# Patient Record
Sex: MALE | Race: AMERICAN INDIAN OR ALASKA NATIVE
[De-identification: names, ages, dates, MRNs, and addresses within clinical notes are randomized per-mention and may not be internally consistent; named-entity substitution may affect disease eponyms.]

---

## 2019-03-29 ENCOUNTER — HOSPITAL ENCOUNTER (INPATIENT)
Dept: HOSPITAL 5 - ED | Age: 54
LOS: 1 days | Discharge: HOME | DRG: 189 | End: 2019-03-30
Attending: INTERNAL MEDICINE | Admitting: HOSPITALIST
Payer: COMMERCIAL

## 2019-03-29 DIAGNOSIS — F17.210: ICD-10-CM

## 2019-03-29 DIAGNOSIS — E87.0: ICD-10-CM

## 2019-03-29 DIAGNOSIS — Z71.6: ICD-10-CM

## 2019-03-29 DIAGNOSIS — J44.9: ICD-10-CM

## 2019-03-29 DIAGNOSIS — R07.9: ICD-10-CM

## 2019-03-29 DIAGNOSIS — J96.01: Primary | ICD-10-CM

## 2019-03-29 DIAGNOSIS — E66.9: ICD-10-CM

## 2019-03-29 DIAGNOSIS — R65.10: ICD-10-CM

## 2019-03-29 DIAGNOSIS — R73.9: ICD-10-CM

## 2019-03-29 LAB
BASOPHILS # (AUTO): 0 K/MM3 (ref 0–0.1)
BASOPHILS NFR BLD AUTO: 0.3 % (ref 0–1.8)
BUN SERPL-MCNC: 9 MG/DL (ref 9–20)
BUN/CREAT SERPL: 10 %
CALCIUM SERPL-MCNC: 9.1 MG/DL (ref 8.4–10.2)
EOSINOPHIL # BLD AUTO: 0.2 K/MM3 (ref 0–0.4)
EOSINOPHIL NFR BLD AUTO: 1.3 % (ref 0–4.3)
HCT VFR BLD CALC: 41.3 % (ref 35.5–45.6)
HEMOLYSIS INDEX: 0
HGB BLD-MCNC: 13.3 GM/DL (ref 11.8–15.2)
LYMPHOCYTES # BLD AUTO: 0.9 K/MM3 (ref 1.2–5.4)
LYMPHOCYTES NFR BLD AUTO: 7.8 % (ref 13.4–35)
MCHC RBC AUTO-ENTMCNC: 32 % (ref 32–34)
MCV RBC AUTO: 83 FL (ref 84–94)
MONOCYTES # (AUTO): 1.3 K/MM3 (ref 0–0.8)
MONOCYTES % (AUTO): 11.1 % (ref 0–7.3)
PLATELET # BLD: 263 K/MM3 (ref 140–440)
RBC # BLD AUTO: 4.99 M/MM3 (ref 3.65–5.03)

## 2019-03-29 PROCEDURE — 93010 ELECTROCARDIOGRAM REPORT: CPT

## 2019-03-29 PROCEDURE — 83036 HEMOGLOBIN GLYCOSYLATED A1C: CPT

## 2019-03-29 PROCEDURE — 93005 ELECTROCARDIOGRAM TRACING: CPT

## 2019-03-29 PROCEDURE — 93306 TTE W/DOPPLER COMPLETE: CPT

## 2019-03-29 PROCEDURE — 71045 X-RAY EXAM CHEST 1 VIEW: CPT

## 2019-03-29 PROCEDURE — 36415 COLL VENOUS BLD VENIPUNCTURE: CPT

## 2019-03-29 PROCEDURE — A9502 TC99M TETROFOSMIN: HCPCS

## 2019-03-29 PROCEDURE — 84484 ASSAY OF TROPONIN QUANT: CPT

## 2019-03-29 PROCEDURE — 85025 COMPLETE CBC W/AUTO DIFF WBC: CPT

## 2019-03-29 PROCEDURE — 83735 ASSAY OF MAGNESIUM: CPT

## 2019-03-29 PROCEDURE — 78452 HT MUSCLE IMAGE SPECT MULT: CPT

## 2019-03-29 PROCEDURE — 85379 FIBRIN DEGRADATION QUANT: CPT

## 2019-03-29 PROCEDURE — 83880 ASSAY OF NATRIURETIC PEPTIDE: CPT

## 2019-03-29 PROCEDURE — 82803 BLOOD GASES ANY COMBINATION: CPT

## 2019-03-29 PROCEDURE — 80048 BASIC METABOLIC PNL TOTAL CA: CPT

## 2019-03-29 PROCEDURE — 94640 AIRWAY INHALATION TREATMENT: CPT

## 2019-03-29 PROCEDURE — 93017 CV STRESS TEST TRACING ONLY: CPT

## 2019-03-30 VITALS — DIASTOLIC BLOOD PRESSURE: 60 MMHG | SYSTOLIC BLOOD PRESSURE: 106 MMHG

## 2019-03-30 PROCEDURE — 4A033R1 MEASUREMENT OF ARTERIAL SATURATION, PERIPHERAL, PERCUTANEOUS APPROACH: ICD-10-PCS | Performed by: HOSPITALIST

## 2019-03-30 RX ADMIN — IPRATROPIUM BROMIDE AND ALBUTEROL SULFATE SCH AMPUL: .5; 3 SOLUTION RESPIRATORY (INHALATION) at 14:21

## 2019-03-30 RX ADMIN — IPRATROPIUM BROMIDE AND ALBUTEROL SULFATE SCH: .5; 3 SOLUTION RESPIRATORY (INHALATION) at 10:22

## 2019-03-30 NOTE — EMERGENCY DEPARTMENT REPORT
HPI





- General


Chief Complaint: Chest Pain


Time Seen by Provider: 19 02:39





- HPI


HPI: 





Room 6





The patient is a 54-year-old male presenting with chief complaint chest pain and

shortness of breath.  The patient states this evening he developed left-sided 

chest pain sharp in nature associated with shortness of breath, nausea/vomiting 

and diaphoresis.  Patient admitted to a cough yesterday productive of yellow 

sputum but denies a cough currently.  The patient states his pain has been 

constant.  Patient states he's never had a stress test or cardiac 

catheterization





Location: [See above]


Duration: [See above]


Quality: Sharp


Severity: Currently 0/10


Modifying factors: [see above]


Context: [see above]


Mode of transportation: [not driving]





ED Past Medical Hx





- Past Medical History


Additional medical history: denies





- Surgical History


Past Surgical History?: No


Additional Surgical History: BL wrist, pelvis, skull





- Family History


Family history: no significant





- Social History


Smoking Status: Current Every Day Smoker (1 pack per day)


Substance Use Type: None (denies illicit drug use)





- Medications


Home Medications: 


                                Home Medications











 Medication  Instructions  Recorded  Confirmed  Last Taken  Type


 


No Known Home Medications [No  19 Unknown History





Reported Home Medications]     














ED Review of Systems


ROS: 


Stated complaint: CHEST PAIN


Other details as noted in HPI





Constitutional: diaphoresis


Eyes: denies: eye pain


ENT: denies: throat pain


Respiratory: cough, shortness of breath


Cardiovascular: chest pain


Endocrine: no symptoms reported


Gastrointestinal: nausea, vomiting


Genitourinary: denies: dysuria


Musculoskeletal: denies: back pain


Neurological: denies: headache





Physical Exam





- Physical Exam


Vital Signs: 


                                   Vital Signs











  19





  19:12 01:58


 


Temperature 98.2 F 98.5 F


 


Pulse Rate 94 H 77


 


Respiratory 24 18





Rate  


 


Blood Pressure 145/85 112/68





[Left]  


 


O2 Sat by Pulse 90 95





Oximetry  











Physical Exam: 





GENERAL: The patient is well-developed well-nourished male sleeping on stretcher

 not appearing to be in acute distress.  Patient awakens easily


HEENT: Normocephalic.  Atraumatic.  Extraocular motions are intact.  Patient has

 moist mucous membranes.


NECK: Supple.  Trachea midline


CHEST/LUNGS: Diminished bilaterally with occasional faint end expiratory 

wheezing on the left.  There is no respiratory distress noted.


HEART/CARDIOVASCULAR: Regular.  There is no tachycardia.  There is no gallop rub

 or murmur.


ABDOMEN: Abdomen is soft, nontender.  Patient has normal bowel sounds.  There is

 no abdominal distention.


SKIN: There is no rash.  There is no edema.  There is no diaphoresis.


NEURO: The patient is awake, alert, and oriented.  The patient is cooperative.  

 The patient has normal speech 


MUSCULOSKELETAL:  There is no evidence of acute injury.





ED Course


                                   Vital Signs











  19





  19:12 01:58


 


Temperature 98.2 F 98.5 F


 


Pulse Rate 94 H 77


 


Respiratory 24 18





Rate  


 


Blood Pressure 145/85 112/68





[Left]  


 


O2 Sat by Pulse 90 95





Oximetry  














ED Medical Decision Making





- Lab Data


Result diagrams: 


                                 19 20:42





                                 19 20:42





                                Laboratory Tests











  19





  20:42 20:42 20:42


 


WBC  11.7 H  


 


RBC  4.99  


 


Hgb  13.3  


 


Hct  41.3  


 


MCV  83 L  


 


MCH  27 L  


 


MCHC  32  


 


RDW  14.2  


 


Plt Count  263  


 


Lymph % (Auto)  7.8 L  


 


Mono % (Auto)  11.1 H  


 


Eos % (Auto)  1.3  


 


Baso % (Auto)  0.3  


 


Lymph #  0.9 L  


 


Mono #  1.3 H  


 


Eos #  0.2  


 


Baso #  0.0  


 


Seg Neutrophils %  79.5 H  


 


Seg Neutrophils #  9.3 H  


 


D-Dimer   


 


POC ABG pH   


 


POC ABG pCO2   


 


POC ABG pO2   


 


POC ABG HCO3   


 


POC ABG Total CO2   


 


POC ABG O2 Sat   


 


POC ABG Base Excess   


 


FiO2   


 


Sodium   148 H 


 


Potassium   4.1 


 


Chloride   101.1 


 


Carbon Dioxide   29 


 


Anion Gap   22 


 


BUN   9 


 


Creatinine   0.9 


 


Estimated GFR   > 60 


 


BUN/Creatinine Ratio   10 


 


Glucose   110 H 


 


Calcium   9.1 


 


Troponin T   < 0.010 


 


NT-Pro-B Natriuret Pep    95.78














  19





  01:00 03:16 04:05


 


WBC   


 


RBC   


 


Hgb   


 


Hct   


 


MCV   


 


MCH   


 


MCHC   


 


RDW   


 


Plt Count   


 


Lymph % (Auto)   


 


Mono % (Auto)   


 


Eos % (Auto)   


 


Baso % (Auto)   


 


Lymph #   


 


Mono #   


 


Eos #   


 


Baso #   


 


Seg Neutrophils %   


 


Seg Neutrophils #   


 


D-Dimer    186.22


 


POC ABG pH   7.379 


 


POC ABG pCO2   47.1 H 


 


POC ABG pO2   50 L 


 


POC ABG HCO3   27.8 


 


POC ABG Total CO2   29 


 


POC ABG O2 Sat   84 


 


POC ABG Base Excess   3 


 


FiO2   21 


 


Sodium   


 


Potassium   


 


Chloride   


 


Carbon Dioxide   


 


Anion Gap   


 


BUN   


 


Creatinine   


 


Estimated GFR   


 


BUN/Creatinine Ratio   


 


Glucose   


 


Calcium   


 


Troponin T  < 0.010  


 


NT-Pro-B Natriuret Pep   














- EKG Data


-: EKG Interpreted by Me


EKG shows normal: sinus rhythm


Rate: normal





- EKG Data


When compared to previous EKG there are: previous EKG unavailable


Interpretation: nonspecific ST-T wave kevin (T-wave inversion in lead aVL)





- Radiology Data


Radiology results: report reviewed (chest x-ray), image reviewed (chest x-ray)





[facility] [address] [place] [address] 

XRay Report Signed Patient: DIEGO RUSS MR#: [id number] : 1965 

Acct:[id number] Age/Sex: 54 / M ADM Date: 19 Loc: ED Attending Dr: 

Ordering Physician: LUIS VALERIO MD Date of Service: 19 Procedure(s): XR 

chest 1V ap Accession Number(s): [id number] cc: LUIS VALERIO MD Fluoro Time In 

Minutes: PROCEDURE: XR CHEST 1V AP TECHNIQUE: A portable upright view the chest 

was obtained. HISTORY: chest pain COMPARISONS: None FINDINGS: The heart size and

 vascularity appear normal. The lungs are clear. Pleural fluid is not seen. The 

bones and soft tissues do not show any acute changes. IMPRESSION: No acute 

cardiopulmonary process.. This document is electronically signed by Sophia Hull MD., 2019 03:36:29 AM ET Transcribed By: RB Dictated By: SOPHIA HULL MD Electronically Authenticated By: SOPHIA HULL MD Signed Date/Time:

 19 DD/DT: 19 TD/TT: 19 





- Differential Diagnosis


pneumonia, ACS, PE, COPD


Critical care attestation.: 


If time is entered above; I have spent that time in minutes in the direct care 

of this critically ill patient, excluding procedure time.








ED Disposition


Clinical Impression: 


 Hypoxia, Chest pain





Disposition:  OP ADMIT IP TO THIS HOSP


Is pt being admited?: Yes


Does the pt Need Aspirin: Yes


Condition: Fair


Instructions:  Chest Pain (ED)


Referrals: 


[facility], [place] [Primary Care Provider] - 3-5 Days


Time of Disposition: 05:11 (hospitalist paged (Dr Yañez))

## 2019-03-30 NOTE — TREADMILL REPORT
NUCLEAR STRESS TEST





DESCRIPTION OF PROCEDURE:  The patient is brought to the Cardiology lab and a

Lexiscan stress test is performed.  The patient tolerated the procedure well.





FINDINGS:  Post-stress images reveal fairly homogenous distribution of the

isotope.  Mild reduction of perfusion is noted in the inferior wall with minimal

reversibility.  This may indicate previous inferior infarction with mild

ischemia; however, this could also be artifactual.  Calculated left ventricular

ejection fraction is 68%.





IMPRESSION:

1.  Fairly homogeneous distribution of the isotope.  Small fixed inferior defect

with minimal reversibility of questionable significance.  May indicate old

inferior myocardial infarction with mild ischemia.

2.  Good systolic function with ejection fraction of 68%.

3.  The inferior abnormalities could also be artifactual.

4.  Suggests clinical correlation.





DD: 03/30/2019 11:04

DT: 03/30/2019 11:53

JOB# [id number]  [id number]

[facility]

## 2019-03-30 NOTE — XRAY REPORT
PROCEDURE: XR CHEST 1V AP 

 

TECHNIQUE:  A portable upright view the chest was obtained. 

 

HISTORY: chest pain 

 

COMPARISONS: None  

 

FINDINGS: 

 

The heart size and vascularity appear normal. The lungs are clear. Pleural fluid is not seen. The bon
es and soft tissues do not show any acute changes. 

 

IMPRESSION:  

 

No acute cardiopulmonary process.. 

 

This document is electronically signed by John Hull MD., March 30 2019 03:36:29 AM ET

## 2019-03-30 NOTE — HISTORY AND PHYSICAL REPORT
History of Present Illness


Date of examination: 03/30/19


Date of admission: 


3/30/2019


Chief complaint: 





Chest pain


History of present illness: 





Patient is a 54-year-old -American male in no known past medical history 

who presented to the ED on account of 1 week history of left-sided chest pain.  

He described it as sharp in character, nonradiating and rated 9/10.  The pain 

waxes and wanes.  No known aggravating or relieving factors.  He has associated 

diaphoresis, shortness of breath, sore throats, runny nose.  He denies 

palpitations, leg swelling, cough, fever, chills, orthopnea or PND.  No 

headaches, nausea, vomiting, lightheadedness, syncope or loss of consciousness. 

No abdominal pain, constipation, diarrhea, dysuria or frequency.





Past History


Past Medical History: No medical history


Past Surgical History: Other (pelvic surgery)


Social history: smoking (patient has 45 years history of cigarette smoking.  He 

currently smokes 1 pack per day), other (he denies alcohol or illicit drug use)


Family history: other (reviewed and noncontributory)





Medications and Allergies


                                    Allergies











Allergy/AdvReac Type Severity Reaction Status Date / Time


 


No Known Allergies Allergy   Verified 03/30/19 02:45











                                Home Medications











 Medication  Instructions  Recorded  Confirmed  Last Taken  Type


 


No Known Home Medications [No  03/30/19 03/30/19 Unknown History





Reported Home Medications]     














Review of Systems


All systems: negative (except as documented in the HPI, all other systems were 

reviewed and negative)





Exam





- Constitutional


Vitals: 


                                        











Temp Pulse Resp BP Pulse Ox


 


 98.5 F   73   17   105/70   93 


 


 03/30/19 01:58  03/30/19 05:00  03/30/19 05:00  03/30/19 05:00  03/30/19 05:00











General appearance: Present: no acute distress, obese





- EENT


Eyes: Present: PERRL, EOM intact


ENT: hearing intact, clear oral mucosa





- Neck


Neck: Present: supple, normal ROM





- Respiratory


Respiratory effort: normal


Respiratory: bilateral: CTA, diminished





- Cardiovascular


Rhythm: regular


Heart Sounds: Present: S1 & S2.  Absent: rub, click





- Extremities


Extremities: No edema


Peripheral Pulses: within normal limits





- Abdominal


General gastrointestinal: Present: soft, non-tender, non-distended, normal bowel

sounds


Male genitourinary: Present: deferred





- Integumentary


Integumentary: Present: clear, warm, dry





- Musculoskeletal


Musculoskeletal: gait normal, strength equal bilaterally





- Psychiatric


Psychiatric: appropriate mood/affect, intact judgment & insight





- Neurologic


Neurologic: CNII-XII intact, moves all extremities





Results





- Labs


CBC & Chem 7: 


                                 03/29/19 20:42





                                 03/29/19 20:42


Labs: 


                             Laboratory Last Values











WBC  11.7 K/mm3 (4.5-11.0)  H  03/29/19  20:42    


 


RBC  4.99 M/mm3 (3.65-5.03)   03/29/19  20:42    


 


Hgb  13.3 gm/dl (11.8-15.2)   03/29/19  20:42    


 


Hct  41.3 % (35.5-45.6)   03/29/19  20:42    


 


MCV  83 fl (84-94)  L  03/29/19  20:42    


 


MCH  27 pg (28-32)  L  03/29/19  20:42    


 


MCHC  32 % (32-34)   03/29/19  20:42    


 


RDW  14.2 % (13.2-15.2)   03/29/19  20:42    


 


Plt Count  263 K/mm3 (140-440)   03/29/19  20:42    


 


Lymph % (Auto)  7.8 % (13.4-35.0)  L  03/29/19  20:42    


 


Mono % (Auto)  11.1 % (0.0-7.3)  H  03/29/19  20:42    


 


Eos % (Auto)  1.3 % (0.0-4.3)   03/29/19  20:42    


 


Baso % (Auto)  0.3 % (0.0-1.8)   03/29/19  20:42    


 


Lymph #  0.9 K/mm3 (1.2-5.4)  L  03/29/19  20:42    


 


Mono #  1.3 K/mm3 (0.0-0.8)  H  03/29/19  20:42    


 


Eos #  0.2 K/mm3 (0.0-0.4)   03/29/19  20:42    


 


Baso #  0.0 K/mm3 (0.0-0.1)   03/29/19  20:42    


 


Seg Neutrophils %  79.5 % (40.0-70.0)  H  03/29/19  20:42    


 


Seg Neutrophils #  9.3 K/mm3 (1.8-7.7)  H  03/29/19  20:42    


 


D-Dimer  186.22 ng/mlDDU (0-234)   03/30/19  04:05    


 


POC ABG pH  7.379  (7.35-7.45)   03/30/19  03:16    


 


POC ABG pCO2  47.1  (35-45)  H  03/30/19  03:16    


 


POC ABG pO2  50  ()  L  03/30/19  03:16    


 


POC ABG HCO3  27.8  (22-26 mml/L)   03/30/19  03:16    


 


POC ABG Total CO2  29  (23-27mmol/L)   03/30/19  03:16    


 


POC ABG O2 Sat  84   03/30/19  03:16    


 


POC ABG Base Excess  3  ((-2) - (+3)mmol/L)   03/30/19  03:16    


 


FiO2  21 %  03/30/19  03:16    


 


Sodium  148 mmol/L (137-145)  H  03/29/19  20:42    


 


Potassium  4.1 mmol/L (3.6-5.0)   03/29/19  20:42    


 


Chloride  101.1 mmol/L ()   03/29/19  20:42    


 


Carbon Dioxide  29 mmol/L (22-30)   03/29/19  20:42    


 


Anion Gap  22 mmol/L  03/29/19  20:42    


 


BUN  9 mg/dL (9-20)   03/29/19  20:42    


 


Creatinine  0.9 mg/dL (0.8-1.5)   03/29/19  20:42    


 


Estimated GFR  > 60 ml/min  03/29/19  20:42    


 


BUN/Creatinine Ratio  10 %  03/29/19  20:42    


 


Glucose  110 mg/dL ()  H  03/29/19  20:42    


 


Calcium  9.1 mg/dL (8.4-10.2)   03/29/19  20:42    


 


Troponin T  < 0.010 ng/mL (0.00-0.029)   03/30/19  01:00    


 


NT-Pro-B Natriuret Pep  95.78 pg/mL (0-900)   03/29/19  20:42    














Assessment and Plan


Assessment and plan: 





Chest pain, rule out ACS


-Chest pain pathway


-Nuclear stress test and echocardiogram for further evaluation





Acute respiratory failure with hypoxia


-Probably secondary to undiagnosed COPD due to his history of tobacco abuse


-Continue oxygen supplementation as needed and nebulizer breathing treatments


-D-dimer negative.





SIRS


-Probably secondary to noninfectious cause


-Urinalysis pending, chest x-ray negative





Hypernatremia 


-probably secondary to volume depletion


-On IV half-normal saline, will monitor sodium level





Hyperglycemia


-Hba1c level pending





Tobacco abuse


-Patient counseled on cessation


-On nicotine patch





Obesity with BMI of 37.6


-Lifestyle modification recommended








DVT prophylaxis with Lovenox








Disposition: For discharge when medically stable





Time spent: 38 minutes

## 2019-03-30 NOTE — DISCHARGE SUMMARY
Providers





- Providers


Date of Admission: 


03/30/19 05:16





Attending physician: 


ОЛЬГА HANDY MD





Primary care physician: 


[facility], MD








Hospitalization


Reason for admission: chest pain, COPD


Condition: Stable


Pertinent studies: 





Stress test negative for acute ischemia


Chest x-ray


Echo


Hospital course: 





Patient is a 54-year-old -American male in no known past medical history 

who presented to the ED on account of 1 week history of left-sided chest pain.  

He described it as sharp in character, nonradiating and rated 9/10.  The pain 

waxes and wanes.  No known aggravating or relieving factors.  He has associated 

diaphoresis, shortness of breath, sore throats, runny nose.  He denies 

palpitations, leg swelling, cough, fever, chills, orthopnea or PND.  No 

headaches, nausea, vomiting, lightheadedness, syncope or loss of consciousness. 

No abdominal pain, constipation, diarrhea, dysuria or frequency.


  Patient is admitted to the floor and cardiac enzymes are negative, EKG no 

STEMI, stress test was negative.  Discussed his cardiology and said that was 

grossly normal and official read is not in the chart.  Patient has wheezing and 

given his long time history of smoking I suspect the patient has new onset COPD 

and started on COPD treatment.  Patient was counseled about cessation of 

smoking.  Patient's chest pain subsided.  Patient wants to be discharged and I 

discharged him.  Patient was hemodynamically stable at the time of discharge.  

Patient was discharged with steroids, when necessary albuterol and NSAIDs.


Disposition: DC-01 TO HOME OR SELFCARE


Time spent for discharge: 32 minutes





- Discharge Diagnoses


(1) Chest pain


Status: Acute   





(2) Hypoxia


Status: Acute   





(3) COPD (chronic obstructive pulmonary disease)


Status: Acute   





Core Measure Documentation





- Palliative Care


Palliative Care/ Comfort Measures: Not Applicable





- Core Measures


Any of the following diagnoses?: none





Exam





- Physical Exam


Narrative exam: 





 Not in cardiopulmonary distress. 


 The patient is obese.


 Vital signs as documented.


 Head exam is unremarkable.


 No scleral icterus .


 Neck is without jugular venous distension, thyromegaly, or carotid bruits. 


 Lungs scattered wheezing.


Cardiac exam reveals regular rate and  Rhythm. 


Abdominal exam reveals normal bowel sounds. 


Extremities are nonedematous and both femoral and pedal pulses are normal.


CNS: Alert and oriented 3.  No focal weakness.





- Constitutional


Vitals: 


                                        











Temp Pulse Resp BP Pulse Ox


 


 98.7 F   83   16   122/66   91 


 


 03/30/19 08:30  03/30/19 08:30  03/30/19 08:30  03/30/19 09:23  03/30/19 08:30














Plan


Activity: no restrictions


Weight Bearing Status: Full Weight Bearing


Diet: regular


Follow up with: 


[facility] [Primary Care Provider] - 3-5 Days


Prescriptions: 


Azithromycin 250 mg PO DAILY #6 tablet


Ibuprofen 600 mg PO Q6H PRN #15 tablet


 PRN Reason: Chest Pain


Prednisone [predniSONE 10 mg (6-Day Pack, 21 Tabs)] 10 mg PO .TAPER #1 tab.ds.pk


ALBUTEROL Inhaler(NF) [VENTOLIN Inhaler(NF)] 1 puff IH Q4H PRN #1 can


 PRN Reason: Dyspnea